# Patient Record
Sex: MALE | Race: WHITE | NOT HISPANIC OR LATINO | URBAN - NONMETROPOLITAN AREA
[De-identification: names, ages, dates, MRNs, and addresses within clinical notes are randomized per-mention and may not be internally consistent; named-entity substitution may affect disease eponyms.]

---

## 2020-07-22 ENCOUNTER — LAB OUTSIDE AN ENCOUNTER (OUTPATIENT)
Dept: URBAN - NONMETROPOLITAN AREA CLINIC 2 | Facility: CLINIC | Age: 75
End: 2020-07-22

## 2020-07-22 ENCOUNTER — OFFICE VISIT (OUTPATIENT)
Dept: URBAN - NONMETROPOLITAN AREA CLINIC 2 | Facility: CLINIC | Age: 75
End: 2020-07-22
Payer: MEDICARE

## 2020-07-22 DIAGNOSIS — K21.9 GERD WITHOUT ESOPHAGITIS: ICD-10-CM

## 2020-07-22 DIAGNOSIS — R13.10 ESOPHAGEAL DYSPHAGIA: ICD-10-CM

## 2020-07-22 PROCEDURE — G8427 DOCREV CUR MEDS BY ELIG CLIN: HCPCS | Performed by: NURSE PRACTITIONER

## 2020-07-22 PROCEDURE — 99204 OFFICE O/P NEW MOD 45 MIN: CPT | Performed by: NURSE PRACTITIONER

## 2020-07-22 PROCEDURE — G8420 CALC BMI NORM PARAMETERS: HCPCS | Performed by: NURSE PRACTITIONER

## 2020-07-22 PROCEDURE — G9903 PT SCRN TBCO ID AS NON USER: HCPCS | Performed by: NURSE PRACTITIONER

## 2020-07-22 RX ORDER — FAMOTIDINE 40 MG/1
(PRIOR AUTH#:000000573666) TABLET, FILM COATED ORAL
Qty: 90 DELAYED RELEASE TABLET | Status: ACTIVE | COMMUNITY

## 2020-07-22 RX ORDER — METHYLPREDNISOLONE 16 MG/1
(PRIOR AUTH#:000000547303) TABLET ORAL
Qty: 30 DELAYED RELEASE TABLET | Status: ACTIVE | COMMUNITY

## 2020-07-22 RX ORDER — SIMVASTATIN 20 MG/1
(PRIOR AUTH#:000009759629) TABLET, FILM COATED ORAL
Qty: 90 DELAYED RELEASE TABLET | Status: ACTIVE | COMMUNITY

## 2020-07-22 RX ORDER — LEVOTHYROXINE SODIUM 100 UG/1
(PRIOR AUTH#:000009759633) TABLET ORAL
Qty: 90 DELAYED RELEASE TABLET | Status: ACTIVE | COMMUNITY

## 2020-07-22 RX ORDER — SITAGLIPTIN AND METFORMIN HYDROCHLORIDE 50; 500 MG/1; MG/1
(PRIOR AUTH#:000009075563) TABLET, FILM COATED ORAL
Qty: 90 DELAYED RELEASE TABLET | Status: ACTIVE | COMMUNITY

## 2020-07-22 RX ORDER — TAMSULOSIN HYDROCHLORIDE 0.4 MG/1
(PRIOR AUTH#:000009075562) CAPSULE ORAL
Qty: 90 CAP | Status: ACTIVE | COMMUNITY

## 2020-07-22 RX ORDER — HYOSCYAMINE SULFATE 0.12 MG/1
1 TABLET UNDER THE TONGUE AND ALLOW TO DISSOLVE EVERY 4-6 HRS  AS NEEDED FOR ABDOMINAL PAIN/SPASM TABLET, ORALLY DISINTEGRATING ORAL THREE TIMES A DAY
Qty: 90 | Refills: 3 | OUTPATIENT
Start: 2020-07-22 | End: 2020-11-18

## 2020-07-22 RX ORDER — METFORMIN HYDROCHLORIDE 500 MG/1
(PRIOR AUTH#:000009075561) TABLET, FILM COATED ORAL
Qty: 90 DELAYED RELEASE TABLET | Status: ACTIVE | COMMUNITY

## 2020-07-22 RX ORDER — ETANERCEPT 50 MG/ML
(PRIOR AUTH#:000006947674) SOLUTION SUBCUTANEOUS
Qty: 12 UNSPECIFIED | Status: ACTIVE | COMMUNITY

## 2020-07-22 RX ORDER — FINASTERIDE 5 MG/1
(PRIOR AUTH#:000009075560) TABLET, FILM COATED ORAL
Qty: 90 DELAYED RELEASE TABLET | Status: ACTIVE | COMMUNITY

## 2020-07-22 RX ORDER — PIOGLITAZONE 15 MG/1
(PRIOR AUTH#:000001068827) TABLET ORAL
Qty: 90 DELAYED RELEASE TABLET | Status: ACTIVE | COMMUNITY

## 2020-07-22 NOTE — HPI-TODAY'S VISIT:
Mr. Javon Cali is a 75 year old male here for dysphagia. He was last seen in 2006 by Dr. Cardoso. He was having dysphagia at that time and had an EGD with an esopahgeal stricture in the lower third of the esophagus, dilated to 50F wiht a negron bougie. He was also found to have gastritis and a small hiatal hernia. Since this time, his dysphagia has progressively getting worse. He is having issues with every meal. He had to regurgitate his food this morning. He denies any reflux and taking pepcid daily. CS

## 2020-07-25 ENCOUNTER — WEB ENCOUNTER (OUTPATIENT)
Dept: URBAN - NONMETROPOLITAN AREA CLINIC 2 | Facility: CLINIC | Age: 75
End: 2020-07-25

## 2020-07-30 ENCOUNTER — OFFICE VISIT (OUTPATIENT)
Dept: URBAN - NONMETROPOLITAN AREA SURGERY CENTER 1 | Facility: SURGERY CENTER | Age: 75
End: 2020-07-30
Payer: MEDICARE

## 2020-07-30 DIAGNOSIS — K22.2 ACQUIRED ESOPHAGEAL RING: ICD-10-CM

## 2020-07-30 PROCEDURE — 43248 EGD GUIDE WIRE INSERTION: CPT | Performed by: INTERNAL MEDICINE

## 2020-07-30 PROCEDURE — G8907 PT DOC NO EVENTS ON DISCHARG: HCPCS | Performed by: INTERNAL MEDICINE

## 2020-08-11 ENCOUNTER — OFFICE VISIT (OUTPATIENT)
Dept: URBAN - METROPOLITAN AREA TELEHEALTH 2 | Facility: TELEHEALTH | Age: 75
End: 2020-08-11
Payer: MEDICARE

## 2020-08-11 DIAGNOSIS — K21.0 GERD WITH ESOPHAGITIS: ICD-10-CM

## 2020-08-11 DIAGNOSIS — R13.19 OTHER DYSPHAGIA: ICD-10-CM

## 2020-08-11 PROCEDURE — G8427 DOCREV CUR MEDS BY ELIG CLIN: HCPCS | Performed by: NURSE PRACTITIONER

## 2020-08-11 PROCEDURE — 3017F COLORECTAL CA SCREEN DOC REV: CPT | Performed by: NURSE PRACTITIONER

## 2020-08-11 PROCEDURE — G8420 CALC BMI NORM PARAMETERS: HCPCS | Performed by: NURSE PRACTITIONER

## 2020-08-11 PROCEDURE — 99213 OFFICE O/P EST LOW 20 MIN: CPT | Performed by: NURSE PRACTITIONER

## 2020-08-11 RX ORDER — SITAGLIPTIN AND METFORMIN HYDROCHLORIDE 50; 500 MG/1; MG/1
(PRIOR AUTH#:000009075563) TABLET, FILM COATED ORAL
Qty: 90 DELAYED RELEASE TABLET | Status: ACTIVE | COMMUNITY

## 2020-08-11 RX ORDER — SIMVASTATIN 20 MG/1
(PRIOR AUTH#:000009759629) TABLET, FILM COATED ORAL
Qty: 90 DELAYED RELEASE TABLET | Status: ACTIVE | COMMUNITY

## 2020-08-11 RX ORDER — METHYLPREDNISOLONE 16 MG/1
(PRIOR AUTH#:000000547303) TABLET ORAL
Qty: 30 DELAYED RELEASE TABLET | Status: ACTIVE | COMMUNITY

## 2020-08-11 RX ORDER — LEVOTHYROXINE SODIUM 100 UG/1
(PRIOR AUTH#:000009759633) TABLET ORAL
Qty: 90 DELAYED RELEASE TABLET | Status: ACTIVE | COMMUNITY

## 2020-08-11 RX ORDER — PIOGLITAZONE 15 MG/1
(PRIOR AUTH#:000001068827) TABLET ORAL
Qty: 90 DELAYED RELEASE TABLET | Status: ACTIVE | COMMUNITY

## 2020-08-11 RX ORDER — FINASTERIDE 5 MG/1
(PRIOR AUTH#:000009075560) TABLET, FILM COATED ORAL
Qty: 90 DELAYED RELEASE TABLET | Status: ACTIVE | COMMUNITY

## 2020-08-11 RX ORDER — SUCRALFATE 1 G/1
1 TABLET ON AN EMPTY STOMACH TABLET ORAL TWICE A DAY
Qty: 60 TABLET | Refills: 3 | OUTPATIENT
Start: 2020-08-11 | End: 2020-12-08

## 2020-08-11 RX ORDER — HYOSCYAMINE SULFATE 0.12 MG/1
1 TABLET UNDER THE TONGUE AND ALLOW TO DISSOLVE EVERY 4-6 HRS  AS NEEDED FOR ABDOMINAL PAIN/SPASM TABLET, ORALLY DISINTEGRATING ORAL THREE TIMES A DAY
Qty: 90 | Refills: 3 | Status: ACTIVE | COMMUNITY
Start: 2020-07-22 | End: 2020-11-18

## 2020-08-11 RX ORDER — FAMOTIDINE 40 MG/1
(PRIOR AUTH#:000000573666) TABLET, FILM COATED ORAL
Qty: 90 DELAYED RELEASE TABLET | Status: ACTIVE | COMMUNITY

## 2020-08-11 RX ORDER — METFORMIN HYDROCHLORIDE 500 MG/1
(PRIOR AUTH#:000009075561) TABLET, FILM COATED ORAL
Qty: 90 DELAYED RELEASE TABLET | Status: ACTIVE | COMMUNITY

## 2020-08-11 RX ORDER — TAMSULOSIN HYDROCHLORIDE 0.4 MG/1
(PRIOR AUTH#:000009075562) CAPSULE ORAL
Qty: 90 CAP | Status: ACTIVE | COMMUNITY

## 2020-08-11 RX ORDER — ETANERCEPT 50 MG/ML
(PRIOR AUTH#:000006947674) SOLUTION SUBCUTANEOUS
Qty: 12 UNSPECIFIED | Status: ACTIVE | COMMUNITY

## 2020-08-11 NOTE — HPI-TODAY'S VISIT:
Mr. Javon Cali is evaluated via telehealth for dysphagia. He had an EGD with an benign appearing esophageal stricture. This was dilated to 36Fr. He also had LA Grade B esophagitis. Since his EGD, he feels 70- 80% better. He continues to have some issues if he does not chew. He denies any painful swallowing. He denies any reflux. Overall, he is feeling better. CS

## 2020-08-11 NOTE — HPI-OTHER HISTORIES
7/22/2020 Mr. Javon Cali is a 75 year old male here for dysphagia. He was last seen in 2006 by Dr. Cardoso. He was having dysphagia at that time and had an EGD with an esopahgeal stricture in the lower third of the esophagus, dilated to 50F wiht a negron bougie. He was also found to have gastritis and a small hiatal hernia. Since this time, his dysphagia has progressively getting worse. He is having issues with every meal. He had to regurgitate his food this morning. He denies any reflux and taking pepcid daily. CS

## 2020-11-13 ENCOUNTER — LAB OUTSIDE AN ENCOUNTER (OUTPATIENT)
Dept: URBAN - METROPOLITAN AREA TELEHEALTH 2 | Facility: TELEHEALTH | Age: 75
End: 2020-11-13

## 2020-11-13 ENCOUNTER — OFFICE VISIT (OUTPATIENT)
Dept: URBAN - METROPOLITAN AREA TELEHEALTH 2 | Facility: TELEHEALTH | Age: 75
End: 2020-11-13
Payer: MEDICARE

## 2020-11-13 ENCOUNTER — TELEPHONE ENCOUNTER (OUTPATIENT)
Dept: URBAN - METROPOLITAN AREA CLINIC 92 | Facility: CLINIC | Age: 75
End: 2020-11-13

## 2020-11-13 DIAGNOSIS — R13.19 CERVICAL DYSPHAGIA: ICD-10-CM

## 2020-11-13 DIAGNOSIS — K21.00 BILE REFLUX ESOPHAGITIS: ICD-10-CM

## 2020-11-13 PROCEDURE — G8427 DOCREV CUR MEDS BY ELIG CLIN: HCPCS | Performed by: INTERNAL MEDICINE

## 2020-11-13 PROCEDURE — 3017F COLORECTAL CA SCREEN DOC REV: CPT | Performed by: INTERNAL MEDICINE

## 2020-11-13 PROCEDURE — 99213 OFFICE O/P EST LOW 20 MIN: CPT | Performed by: INTERNAL MEDICINE

## 2020-11-13 PROCEDURE — G8420 CALC BMI NORM PARAMETERS: HCPCS | Performed by: INTERNAL MEDICINE

## 2020-11-13 RX ORDER — SUCRALFATE 1 G/1
1 TABLET ON AN EMPTY STOMACH TABLET ORAL TWICE A DAY
Qty: 60 TABLET | Refills: 3 | OUTPATIENT

## 2020-11-13 RX ORDER — FAMOTIDINE 40 MG/1
(PRIOR AUTH#:000000573666) TABLET, FILM COATED ORAL
Qty: 90 DELAYED RELEASE TABLET | Status: ACTIVE | COMMUNITY

## 2020-11-13 RX ORDER — SITAGLIPTIN AND METFORMIN HYDROCHLORIDE 50; 500 MG/1; MG/1
(PRIOR AUTH#:000009075563) TABLET, FILM COATED ORAL
Qty: 90 DELAYED RELEASE TABLET | Status: ACTIVE | COMMUNITY

## 2020-11-13 RX ORDER — METFORMIN HYDROCHLORIDE 500 MG/1
(PRIOR AUTH#:000009075561) TABLET, FILM COATED ORAL
Qty: 90 DELAYED RELEASE TABLET | Status: ACTIVE | COMMUNITY

## 2020-11-13 RX ORDER — SIMVASTATIN 20 MG/1
(PRIOR AUTH#:000009759629) TABLET, FILM COATED ORAL
Qty: 90 DELAYED RELEASE TABLET | Status: ACTIVE | COMMUNITY

## 2020-11-13 RX ORDER — LEVOTHYROXINE SODIUM 100 UG/1
(PRIOR AUTH#:000009759633) TABLET ORAL
Qty: 90 DELAYED RELEASE TABLET | Status: ACTIVE | COMMUNITY

## 2020-11-13 RX ORDER — METHYLPREDNISOLONE 16 MG/1
(PRIOR AUTH#:000000547303) TABLET ORAL
Qty: 30 DELAYED RELEASE TABLET | Status: ACTIVE | COMMUNITY

## 2020-11-13 RX ORDER — FINASTERIDE 5 MG/1
(PRIOR AUTH#:000009075560) TABLET, FILM COATED ORAL
Qty: 90 DELAYED RELEASE TABLET | Status: ACTIVE | COMMUNITY

## 2020-11-13 RX ORDER — PIOGLITAZONE 15 MG/1
(PRIOR AUTH#:000001068827) TABLET ORAL
Qty: 90 DELAYED RELEASE TABLET | Status: ACTIVE | COMMUNITY

## 2020-11-13 RX ORDER — ETANERCEPT 50 MG/ML
(PRIOR AUTH#:000006947674) SOLUTION SUBCUTANEOUS
Qty: 12 UNSPECIFIED | Status: ACTIVE | COMMUNITY

## 2020-11-13 RX ORDER — TAMSULOSIN HYDROCHLORIDE 0.4 MG/1
(PRIOR AUTH#:000009075562) CAPSULE ORAL
Qty: 90 CAP | Status: ACTIVE | COMMUNITY

## 2020-11-13 RX ORDER — SUCRALFATE 1 G/1
1 TABLET ON AN EMPTY STOMACH TABLET ORAL TWICE A DAY
Qty: 60 TABLET | Refills: 3 | Status: ACTIVE | COMMUNITY
Start: 2020-08-11 | End: 2020-12-08

## 2020-11-13 RX ORDER — HYOSCYAMINE SULFATE 0.12 MG/1
1 TABLET UNDER THE TONGUE AND ALLOW TO DISSOLVE EVERY 4-6 HRS  AS NEEDED FOR ABDOMINAL PAIN/SPASM TABLET, ORALLY DISINTEGRATING ORAL THREE TIMES A DAY
Qty: 90 | Refills: 3 | Status: ACTIVE | COMMUNITY
Start: 2020-07-22 | End: 2020-11-18

## 2020-11-13 NOTE — HPI-OTHER HISTORIES
7/22/2020 Mr. Javon Cali is a 75 year old male here for dysphagia. He was last seen in 2006 by Dr. Cardoso. He was having dysphagia at that time and had an EGD with an esopahgeal stricture in the lower third of the esophagus, dilated to 50F wiht a negron bougie. He was also found to have gastritis and a small hiatal hernia. Since this time, his dysphagia has progressively getting worse. He is having issues with every meal. He had to regurgitate his food this morning. He denies any reflux and taking pepcid daily. CS   8/11/2020 Mr. Javon Cali is evaluated via telehealth for dysphagia. He had an EGD with an benign appearing esophageal stricture. This was dilated to 36Fr. He also had LA Grade B esophagitis. Since his EGD, he feels 70- 80% better. He continues to have some issues if he does not chew. He denies any painful swallowing. He denies any reflux. Overall, he is feeling better. CS

## 2020-11-13 NOTE — HPI-TODAY'S VISIT:
8/11/2020 Mr. Javon Cali is evaluated via telehealth for dysphagia. He had an EGD in July with an benign appearing esophageal stricture. This was dilated to 36Fr. He also had LA Grade B esophagitis. At his last OV, he was feeling better. We started carafate in the hope that his left over symptoms were related to esophagitis. This has not helped and now his symptoms have returned and he is back having to regurgitate his food. CS

## 2020-12-03 ENCOUNTER — OFFICE VISIT (OUTPATIENT)
Dept: URBAN - NONMETROPOLITAN AREA SURGERY CENTER 1 | Facility: SURGERY CENTER | Age: 75
End: 2020-12-03

## 2020-12-17 ENCOUNTER — OFFICE VISIT (OUTPATIENT)
Dept: URBAN - NONMETROPOLITAN AREA SURGERY CENTER 1 | Facility: SURGERY CENTER | Age: 75
End: 2020-12-17
Payer: MEDICARE

## 2020-12-17 ENCOUNTER — TELEPHONE ENCOUNTER (OUTPATIENT)
Dept: URBAN - METROPOLITAN AREA CLINIC 92 | Facility: CLINIC | Age: 75
End: 2020-12-17

## 2020-12-17 ENCOUNTER — CLAIMS CREATED FROM THE CLAIM WINDOW (OUTPATIENT)
Dept: URBAN - METROPOLITAN AREA CLINIC 4 | Facility: CLINIC | Age: 75
End: 2020-12-17
Payer: MEDICARE

## 2020-12-17 DIAGNOSIS — K21.00 GASTRO-ESOPHAGEAL REFLUX DISEASE WITH ESOPHAGITIS, WITHOUT BLEEDING: ICD-10-CM

## 2020-12-17 DIAGNOSIS — K21.9 ACID REFLUX: ICD-10-CM

## 2020-12-17 DIAGNOSIS — K22.2 ACQUIRED ESOPHAGEAL RING: ICD-10-CM

## 2020-12-17 DIAGNOSIS — K25.7 CHRONIC GASTRIC ULCER WITHOUT HEMORRHAGE OR PERFORATION: ICD-10-CM

## 2020-12-17 DIAGNOSIS — K25.7: ICD-10-CM

## 2020-12-17 PROCEDURE — G8907 PT DOC NO EVENTS ON DISCHARG: HCPCS | Performed by: INTERNAL MEDICINE

## 2020-12-17 PROCEDURE — 43239 EGD BIOPSY SINGLE/MULTIPLE: CPT | Performed by: INTERNAL MEDICINE

## 2020-12-17 PROCEDURE — 88305 TISSUE EXAM BY PATHOLOGIST: CPT | Performed by: PATHOLOGY

## 2020-12-17 PROCEDURE — 43248 EGD GUIDE WIRE INSERTION: CPT | Performed by: INTERNAL MEDICINE

## 2020-12-17 PROCEDURE — 88341 IMHCHEM/IMCYTCHM EA ADD ANTB: CPT | Performed by: PATHOLOGY

## 2020-12-17 PROCEDURE — 88312 SPECIAL STAINS GROUP 1: CPT | Performed by: PATHOLOGY

## 2020-12-17 PROCEDURE — 88342 IMHCHEM/IMCYTCHM 1ST ANTB: CPT | Performed by: PATHOLOGY

## 2020-12-17 RX ORDER — TAMSULOSIN HYDROCHLORIDE 0.4 MG/1
(PRIOR AUTH#:000009075562) CAPSULE ORAL
Qty: 90 CAP | Status: ACTIVE | COMMUNITY

## 2020-12-17 RX ORDER — FAMOTIDINE 40 MG/1
(PRIOR AUTH#:000000573666) TABLET, FILM COATED ORAL
Qty: 90 DELAYED RELEASE TABLET | Status: ACTIVE | COMMUNITY

## 2020-12-17 RX ORDER — METFORMIN HYDROCHLORIDE 500 MG/1
(PRIOR AUTH#:000009075561) TABLET, FILM COATED ORAL
Qty: 90 DELAYED RELEASE TABLET | Status: ACTIVE | COMMUNITY

## 2020-12-17 RX ORDER — PANTOPRAZOLE SODIUM 40 MG/1
1 TABLET TABLET, DELAYED RELEASE ORAL BID
Qty: 60 | Refills: 6 | OUTPATIENT
Start: 2020-12-17

## 2020-12-17 RX ORDER — SITAGLIPTIN AND METFORMIN HYDROCHLORIDE 50; 500 MG/1; MG/1
(PRIOR AUTH#:000009075563) TABLET, FILM COATED ORAL
Qty: 90 DELAYED RELEASE TABLET | Status: ACTIVE | COMMUNITY

## 2020-12-17 RX ORDER — PIOGLITAZONE 15 MG/1
(PRIOR AUTH#:000001068827) TABLET ORAL
Qty: 90 DELAYED RELEASE TABLET | Status: ACTIVE | COMMUNITY

## 2020-12-17 RX ORDER — ETANERCEPT 50 MG/ML
(PRIOR AUTH#:000006947674) SOLUTION SUBCUTANEOUS
Qty: 12 UNSPECIFIED | Status: ACTIVE | COMMUNITY

## 2020-12-17 RX ORDER — SIMVASTATIN 20 MG/1
(PRIOR AUTH#:000009759629) TABLET, FILM COATED ORAL
Qty: 90 DELAYED RELEASE TABLET | Status: ACTIVE | COMMUNITY

## 2020-12-17 RX ORDER — SUCRALFATE 1 G/1
1 TABLET ON AN EMPTY STOMACH TABLET ORAL TWICE A DAY
Qty: 60 TABLET | Refills: 3 | Status: ACTIVE | COMMUNITY

## 2020-12-17 RX ORDER — METHYLPREDNISOLONE 16 MG/1
(PRIOR AUTH#:000000547303) TABLET ORAL
Qty: 30 DELAYED RELEASE TABLET | Status: ACTIVE | COMMUNITY

## 2020-12-17 RX ORDER — LEVOTHYROXINE SODIUM 100 UG/1
(PRIOR AUTH#:000009759633) TABLET ORAL
Qty: 90 DELAYED RELEASE TABLET | Status: ACTIVE | COMMUNITY

## 2020-12-17 RX ORDER — FINASTERIDE 5 MG/1
(PRIOR AUTH#:000009075560) TABLET, FILM COATED ORAL
Qty: 90 DELAYED RELEASE TABLET | Status: ACTIVE | COMMUNITY

## 2021-01-15 ENCOUNTER — OFFICE VISIT (OUTPATIENT)
Dept: URBAN - METROPOLITAN AREA TELEHEALTH 2 | Facility: TELEHEALTH | Age: 76
End: 2021-01-15
Payer: MEDICARE

## 2021-01-15 DIAGNOSIS — R13.10 ESOPHAGEAL DYSPHAGIA: ICD-10-CM

## 2021-01-15 DIAGNOSIS — K21.00 CHRONIC REFLUX ESOPHAGITIS: ICD-10-CM

## 2021-01-15 PROBLEM — 266433003: Status: ACTIVE | Noted: 2020-08-11

## 2021-01-15 PROBLEM — 40890009: Status: ACTIVE | Noted: 2020-08-11

## 2021-01-15 PROCEDURE — 99213 OFFICE O/P EST LOW 20 MIN: CPT | Performed by: NURSE PRACTITIONER

## 2021-01-15 PROCEDURE — 3017F COLORECTAL CA SCREEN DOC REV: CPT | Performed by: NURSE PRACTITIONER

## 2021-01-15 PROCEDURE — G8427 DOCREV CUR MEDS BY ELIG CLIN: HCPCS | Performed by: NURSE PRACTITIONER

## 2021-01-15 PROCEDURE — G8420 CALC BMI NORM PARAMETERS: HCPCS | Performed by: NURSE PRACTITIONER

## 2021-01-15 RX ORDER — LEVOTHYROXINE SODIUM 100 UG/1
(PRIOR AUTH#:000009759633) TABLET ORAL
Qty: 90 DELAYED RELEASE TABLET | Status: ACTIVE | COMMUNITY

## 2021-01-15 RX ORDER — FINASTERIDE 5 MG/1
(PRIOR AUTH#:000009075560) TABLET, FILM COATED ORAL
Qty: 90 DELAYED RELEASE TABLET | Status: ACTIVE | COMMUNITY

## 2021-01-15 RX ORDER — METFORMIN HYDROCHLORIDE 500 MG/1
(PRIOR AUTH#:000009075561) TABLET, FILM COATED ORAL
Qty: 90 DELAYED RELEASE TABLET | Status: ACTIVE | COMMUNITY

## 2021-01-15 RX ORDER — PIOGLITAZONE 15 MG/1
(PRIOR AUTH#:000001068827) TABLET ORAL
Qty: 90 DELAYED RELEASE TABLET | Status: ACTIVE | COMMUNITY

## 2021-01-15 RX ORDER — SUCRALFATE 1 G/1
1 TABLET ON AN EMPTY STOMACH TABLET ORAL TWICE A DAY
Qty: 60 TABLET | Refills: 3 | OUTPATIENT

## 2021-01-15 RX ORDER — SUCRALFATE 1 G/1
1 TABLET ON AN EMPTY STOMACH TABLET ORAL TWICE A DAY
Qty: 60 TABLET | Refills: 3 | Status: ACTIVE | COMMUNITY

## 2021-01-15 RX ORDER — FAMOTIDINE 40 MG/1
(PRIOR AUTH#:000000573666) TABLET, FILM COATED ORAL
Qty: 90 DELAYED RELEASE TABLET | Status: ACTIVE | COMMUNITY

## 2021-01-15 RX ORDER — TAMSULOSIN HYDROCHLORIDE 0.4 MG/1
(PRIOR AUTH#:000009075562) CAPSULE ORAL
Qty: 90 CAP | Status: ACTIVE | COMMUNITY

## 2021-01-15 RX ORDER — SIMVASTATIN 20 MG/1
(PRIOR AUTH#:000009759629) TABLET, FILM COATED ORAL
Qty: 90 DELAYED RELEASE TABLET | Status: ACTIVE | COMMUNITY

## 2021-01-15 RX ORDER — ETANERCEPT 50 MG/ML
(PRIOR AUTH#:000006947674) SOLUTION SUBCUTANEOUS
Qty: 12 UNSPECIFIED | Status: ACTIVE | COMMUNITY

## 2021-01-15 RX ORDER — METHYLPREDNISOLONE 16 MG/1
(PRIOR AUTH#:000000547303) TABLET ORAL
Qty: 30 DELAYED RELEASE TABLET | Status: ACTIVE | COMMUNITY

## 2021-01-15 RX ORDER — PANTOPRAZOLE SODIUM 40 MG/1
1 TABLET TABLET, DELAYED RELEASE ORAL BID
Qty: 60 | Refills: 6 | Status: ACTIVE | COMMUNITY
Start: 2020-12-17

## 2021-01-15 RX ORDER — SITAGLIPTIN AND METFORMIN HYDROCHLORIDE 50; 500 MG/1; MG/1
(PRIOR AUTH#:000009075563) TABLET, FILM COATED ORAL
Qty: 90 DELAYED RELEASE TABLET | Status: ACTIVE | COMMUNITY

## 2021-01-15 NOTE — HPI-OTHER HISTORIES
7/22/2020 Mr. Javon Cali is a 75 year old male here for dysphagia. He was last seen in 2006 by Dr. Cardoso. He was having dysphagia at that time and had an EGD with an esopahgeal stricture in the lower third of the esophagus, dilated to 50F wiht a negron bougie. He was also found to have gastritis and a small hiatal hernia. Since this time, his dysphagia has progressively getting worse. He is having issues with every meal. He had to regurgitate his food this morning. He denies any reflux and taking pepcid daily. CS   8/11/2020 Mr. Javon Cali is evaluated via telehealth for dysphagia. He had an EGD with an benign appearing esophageal stricture. This was dilated to 36Fr. He also had LA Grade B esophagitis. Since his EGD, he feels 70- 80% better. He continues to have some issues if he does not chew. He denies any painful swallowing. He denies any reflux. Overall, he is feeling better. CS   10/13/2020 Mr. Javon Cali is evaluated via telehealth for dysphagia. He had an EGD in July with an benign appearing esophageal stricture. This was dilated to 36Fr. He also had LA Grade B esophagitis. At his last OV, he was feeling better. We started carafate in the hope that his left over symptoms were related to esophagitis. This has not helped and now his symptoms have returned and he is back having to regurgitate his food. CS

## 2021-01-15 NOTE — HPI-TODAY'S VISIT:
1/15/2021 Mr. Javon Cali is evaluated via telehealth for dysphagia. He had an EGD in July with an benign appearing esophageal stricture. This was dilated to 36Fr. He also had LA Grade B esophagitis. He felt better for a period of time on pepcid and carafate. His symptoms then returned. He had a repeat EGD 12/18 with esophagitis, gastritis, and stricture was dlated to 48Fr. He was also started on protonix 40mg BID. Today, he feels well. He has had no issues with his swallowing. CS

## 2021-02-09 ENCOUNTER — TELEPHONE ENCOUNTER (OUTPATIENT)
Dept: URBAN - NONMETROPOLITAN AREA CLINIC 2 | Facility: CLINIC | Age: 76
End: 2021-02-09

## 2021-02-09 RX ORDER — PANTOPRAZOLE SODIUM 40 MG/1
1 TABLET TABLET, DELAYED RELEASE ORAL BID
Qty: 180 TABLET | Refills: 3
Start: 2020-12-17

## 2022-03-16 ENCOUNTER — ERX REFILL RESPONSE (OUTPATIENT)
Dept: URBAN - NONMETROPOLITAN AREA CLINIC 2 | Facility: CLINIC | Age: 77
End: 2022-03-16

## 2022-03-16 RX ORDER — PANTOPRAZOLE 40 MG/1
TAKE 1 TABLET TWICE A DAY TABLET, DELAYED RELEASE ORAL
Qty: 180 TABLET | Refills: 4 | OUTPATIENT

## 2022-03-16 RX ORDER — PANTOPRAZOLE SODIUM 40 MG/1
1 TABLET TABLET, DELAYED RELEASE ORAL BID
Qty: 180 TABLET | Refills: 3 | OUTPATIENT

## 2022-03-23 ENCOUNTER — P2P PATIENT RECORD (OUTPATIENT)
Age: 77
End: 2022-03-23

## 2022-05-11 ENCOUNTER — WEB ENCOUNTER (OUTPATIENT)
Dept: URBAN - NONMETROPOLITAN AREA CLINIC 2 | Facility: CLINIC | Age: 77
End: 2022-05-11

## 2022-05-11 ENCOUNTER — OFFICE VISIT (OUTPATIENT)
Dept: URBAN - NONMETROPOLITAN AREA CLINIC 2 | Facility: CLINIC | Age: 77
End: 2022-05-11
Payer: MEDICARE

## 2022-05-11 DIAGNOSIS — R13.19 ESOPHAGEAL DYSPHAGIA: ICD-10-CM

## 2022-05-11 PROCEDURE — 99214 OFFICE O/P EST MOD 30 MIN: CPT | Performed by: INTERNAL MEDICINE

## 2022-05-11 RX ORDER — SITAGLIPTIN AND METFORMIN HYDROCHLORIDE 50; 500 MG/1; MG/1
(PRIOR AUTH#:000009075563) TABLET, FILM COATED ORAL
Qty: 90 DELAYED RELEASE TABLET | Status: ACTIVE | COMMUNITY

## 2022-05-11 RX ORDER — FAMOTIDINE 40 MG/1
(PRIOR AUTH#:000000573666) TABLET, FILM COATED ORAL
Qty: 90 DELAYED RELEASE TABLET | Status: ACTIVE | COMMUNITY

## 2022-05-11 RX ORDER — LEVOTHYROXINE SODIUM 100 UG/1
(PRIOR AUTH#:000009759633) TABLET ORAL
Qty: 90 DELAYED RELEASE TABLET | Status: ACTIVE | COMMUNITY

## 2022-05-11 RX ORDER — PIOGLITAZONE 15 MG/1
(PRIOR AUTH#:000001068827) TABLET ORAL
Qty: 90 DELAYED RELEASE TABLET | Status: ACTIVE | COMMUNITY

## 2022-05-11 RX ORDER — METHYLPREDNISOLONE 16 MG/1
(PRIOR AUTH#:000000547303) TABLET ORAL
Qty: 30 DELAYED RELEASE TABLET | Status: ACTIVE | COMMUNITY

## 2022-05-11 RX ORDER — PANTOPRAZOLE 40 MG/1
TAKE 1 TABLET TWICE A DAY TABLET, DELAYED RELEASE ORAL
Qty: 180 TABLET | Refills: 4 | Status: ACTIVE | COMMUNITY

## 2022-05-11 RX ORDER — FINASTERIDE 5 MG/1
(PRIOR AUTH#:000009075560) TABLET, FILM COATED ORAL
Qty: 90 DELAYED RELEASE TABLET | Status: ACTIVE | COMMUNITY

## 2022-05-11 RX ORDER — TAMSULOSIN HYDROCHLORIDE 0.4 MG/1
(PRIOR AUTH#:000009075562) CAPSULE ORAL
Qty: 90 CAP | Status: ACTIVE | COMMUNITY

## 2022-05-11 RX ORDER — SUCRALFATE 1 G/1
1 TABLET ON AN EMPTY STOMACH TABLET ORAL TWICE A DAY
Qty: 60 TABLET | Refills: 3 | Status: ACTIVE | COMMUNITY

## 2022-05-11 RX ORDER — METFORMIN HYDROCHLORIDE 500 MG/1
(PRIOR AUTH#:000009075561) TABLET, FILM COATED ORAL
Qty: 90 DELAYED RELEASE TABLET | Status: ACTIVE | COMMUNITY

## 2022-05-11 RX ORDER — SIMVASTATIN 20 MG/1
(PRIOR AUTH#:000009759629) TABLET, FILM COATED ORAL
Qty: 90 DELAYED RELEASE TABLET | Status: ACTIVE | COMMUNITY

## 2022-05-11 RX ORDER — ETANERCEPT 50 MG/ML
(PRIOR AUTH#:000006947674) SOLUTION SUBCUTANEOUS
Qty: 12 UNSPECIFIED | Status: ACTIVE | COMMUNITY

## 2022-05-11 NOTE — HPI-TODAY'S VISIT:
5/11/2022 Mr. Cali returns for follow-up.  He has a history of esophageal stenosis requiring dilation.  The last time he was dilated it was in 2020.  He was dilated with a 48 Cymraes savory dilator.  He has done well for the last 2 years but has noticed some increasing solid food dysphagia.  This happens 7-8 times per month.  He has not had any weight loss.

## 2022-05-31 ENCOUNTER — CLAIMS CREATED FROM THE CLAIM WINDOW (OUTPATIENT)
Dept: URBAN - METROPOLITAN AREA CLINIC 4 | Facility: CLINIC | Age: 77
End: 2022-05-31
Payer: MEDICARE

## 2022-05-31 ENCOUNTER — OFFICE VISIT (OUTPATIENT)
Dept: URBAN - NONMETROPOLITAN AREA SURGERY CENTER 1 | Facility: SURGERY CENTER | Age: 77
End: 2022-05-31
Payer: MEDICARE

## 2022-05-31 DIAGNOSIS — K22.2 ACQUIRED ESOPHAGEAL RING: ICD-10-CM

## 2022-05-31 DIAGNOSIS — K31.89 ACQUIRED DEFORMITY OF DUODENUM: ICD-10-CM

## 2022-05-31 DIAGNOSIS — K29.70 GASTRITIS, UNSPECIFIED, WITHOUT BLEEDING: ICD-10-CM

## 2022-05-31 PROCEDURE — 88305 TISSUE EXAM BY PATHOLOGIST: CPT | Performed by: PATHOLOGY

## 2022-05-31 PROCEDURE — 88312 SPECIAL STAINS GROUP 1: CPT | Performed by: PATHOLOGY

## 2022-05-31 PROCEDURE — 43239 EGD BIOPSY SINGLE/MULTIPLE: CPT | Performed by: INTERNAL MEDICINE

## 2022-05-31 PROCEDURE — G8907 PT DOC NO EVENTS ON DISCHARG: HCPCS | Performed by: INTERNAL MEDICINE

## 2022-05-31 PROCEDURE — 43248 EGD GUIDE WIRE INSERTION: CPT | Performed by: INTERNAL MEDICINE

## 2022-06-02 ENCOUNTER — TELEPHONE ENCOUNTER (OUTPATIENT)
Dept: URBAN - NONMETROPOLITAN AREA CLINIC 2 | Facility: CLINIC | Age: 77
End: 2022-06-02

## 2022-08-26 ENCOUNTER — TELEPHONE ENCOUNTER (OUTPATIENT)
Dept: URBAN - NONMETROPOLITAN AREA CLINIC 2 | Facility: CLINIC | Age: 77
End: 2022-08-26

## 2022-09-01 ENCOUNTER — CLAIMS CREATED FROM THE CLAIM WINDOW (OUTPATIENT)
Dept: URBAN - NONMETROPOLITAN AREA CLINIC 13 | Facility: CLINIC | Age: 77
End: 2022-09-01
Payer: MEDICARE

## 2022-09-01 ENCOUNTER — LAB OUTSIDE AN ENCOUNTER (OUTPATIENT)
Dept: URBAN - NONMETROPOLITAN AREA CLINIC 13 | Facility: CLINIC | Age: 77
End: 2022-09-01

## 2022-09-01 ENCOUNTER — WEB ENCOUNTER (OUTPATIENT)
Dept: URBAN - NONMETROPOLITAN AREA CLINIC 13 | Facility: CLINIC | Age: 77
End: 2022-09-01

## 2022-09-01 VITALS
DIASTOLIC BLOOD PRESSURE: 79 MMHG | WEIGHT: 164 LBS | HEIGHT: 67 IN | HEART RATE: 61 BPM | SYSTOLIC BLOOD PRESSURE: 135 MMHG | BODY MASS INDEX: 25.74 KG/M2

## 2022-09-01 DIAGNOSIS — R13.19 ESOPHAGEAL DYSPHAGIA: ICD-10-CM

## 2022-09-01 PROCEDURE — 99214 OFFICE O/P EST MOD 30 MIN: CPT | Performed by: NURSE PRACTITIONER

## 2022-09-01 RX ORDER — SIMVASTATIN 20 MG/1
(PRIOR AUTH#:000009759629) TABLET, FILM COATED ORAL
Qty: 90 DELAYED RELEASE TABLET | Status: ACTIVE | COMMUNITY

## 2022-09-01 RX ORDER — SITAGLIPTIN AND METFORMIN HYDROCHLORIDE 50; 500 MG/1; MG/1
(PRIOR AUTH#:000009075563) TABLET, FILM COATED ORAL
Qty: 90 DELAYED RELEASE TABLET | Status: ACTIVE | COMMUNITY

## 2022-09-01 RX ORDER — LEVOTHYROXINE SODIUM 100 UG/1
(PRIOR AUTH#:000009759633) TABLET ORAL
Qty: 90 DELAYED RELEASE TABLET | Status: ACTIVE | COMMUNITY

## 2022-09-01 RX ORDER — ETANERCEPT 50 MG/ML
(PRIOR AUTH#:000006947674) SOLUTION SUBCUTANEOUS
Qty: 12 UNSPECIFIED | Status: ACTIVE | COMMUNITY

## 2022-09-01 RX ORDER — PANTOPRAZOLE 40 MG/1
TAKE 1 TABLET TWICE A DAY TABLET, DELAYED RELEASE ORAL
Qty: 180 TABLET | Refills: 4 | Status: ACTIVE | COMMUNITY

## 2022-09-01 RX ORDER — PIOGLITAZONE 15 MG/1
(PRIOR AUTH#:000001068827) TABLET ORAL
Qty: 90 DELAYED RELEASE TABLET | Status: ACTIVE | COMMUNITY

## 2022-09-01 RX ORDER — METHYLPREDNISOLONE 16 MG/1
(PRIOR AUTH#:000000547303) TABLET ORAL
Qty: 30 DELAYED RELEASE TABLET | Status: ACTIVE | COMMUNITY

## 2022-09-01 RX ORDER — TAMSULOSIN HYDROCHLORIDE 0.4 MG/1
(PRIOR AUTH#:000009075562) CAPSULE ORAL
Qty: 90 CAP | Status: ACTIVE | COMMUNITY

## 2022-09-01 RX ORDER — FAMOTIDINE 40 MG/1
(PRIOR AUTH#:000000573666) TABLET, FILM COATED ORAL
Qty: 90 DELAYED RELEASE TABLET | Status: ACTIVE | COMMUNITY

## 2022-09-01 RX ORDER — FINASTERIDE 5 MG/1
(PRIOR AUTH#:000009075560) TABLET, FILM COATED ORAL
Qty: 90 DELAYED RELEASE TABLET | Status: ACTIVE | COMMUNITY

## 2022-09-01 RX ORDER — METFORMIN HYDROCHLORIDE 500 MG/1
(PRIOR AUTH#:000009075561) TABLET, FILM COATED ORAL
Qty: 90 DELAYED RELEASE TABLET | Status: ACTIVE | COMMUNITY

## 2022-09-01 RX ORDER — SUCRALFATE 1 G/1
1 TABLET ON AN EMPTY STOMACH TABLET ORAL TWICE A DAY
Qty: 60 TABLET | Refills: 3 | Status: ACTIVE | COMMUNITY

## 2022-09-01 NOTE — HPI-TODAY'S VISIT:
9/1/2022  Mr. Cali returns for dysphagia.  He has a history of esophageal stenosis requiring dilation.  The last time he was dilated it was 5/31.  He was dilated with a 42 Belizean savory dilator.  This helped until last week. He ate a salad and it got stuck. He was unable to handle his secretions for about 6 hrs.  He is ready to schedule another dilatation. CS

## 2022-09-01 NOTE — HPI-OTHER HISTORIES
5/11/2022 Mr. Cali returns for follow-up.  He has a history of esophageal stenosis requiring dilation.  The last time he was dilated it was in 2020.  He was dilated with a 48 Telugu savory dilator.  He has done well for the last 2 years but has noticed some increasing solid food dysphagia.  This happens 7-8 times per month.  He has not had any weight loss.  5/31/2022 EGD: Moderate stenosis mesauring 8mm, dilated with Savory 42F

## 2022-09-02 ENCOUNTER — OFFICE VISIT (OUTPATIENT)
Dept: URBAN - METROPOLITAN AREA MEDICAL CENTER 1 | Facility: MEDICAL CENTER | Age: 77
End: 2022-09-02
Payer: MEDICARE

## 2022-09-02 DIAGNOSIS — K22.89 DILATATION OF ESOPHAGUS: ICD-10-CM

## 2022-09-02 DIAGNOSIS — K22.2 ACQUIRED ESOPHAGEAL RING: ICD-10-CM

## 2022-09-02 PROCEDURE — 43248 EGD GUIDE WIRE INSERTION: CPT | Performed by: INTERNAL MEDICINE

## 2022-12-20 ENCOUNTER — OFFICE VISIT (OUTPATIENT)
Dept: URBAN - NONMETROPOLITAN AREA CLINIC 13 | Facility: CLINIC | Age: 77
End: 2022-12-20
Payer: MEDICARE

## 2022-12-20 VITALS
WEIGHT: 163 LBS | TEMPERATURE: 98.5 F | DIASTOLIC BLOOD PRESSURE: 67 MMHG | HEIGHT: 67 IN | SYSTOLIC BLOOD PRESSURE: 146 MMHG | HEART RATE: 76 BPM | BODY MASS INDEX: 25.58 KG/M2

## 2022-12-20 DIAGNOSIS — R13.19 ESOPHAGEAL DYSPHAGIA: ICD-10-CM

## 2022-12-20 PROCEDURE — 99213 OFFICE O/P EST LOW 20 MIN: CPT | Performed by: NURSE PRACTITIONER

## 2022-12-20 RX ORDER — LEVOTHYROXINE SODIUM 100 UG/1
(PRIOR AUTH#:000009759633) TABLET ORAL
Qty: 90 DELAYED RELEASE TABLET | Status: ACTIVE | COMMUNITY

## 2022-12-20 RX ORDER — FINASTERIDE 5 MG/1
(PRIOR AUTH#:000009075560) TABLET, FILM COATED ORAL
Qty: 90 DELAYED RELEASE TABLET | Status: ACTIVE | COMMUNITY

## 2022-12-20 RX ORDER — PIOGLITAZONE 15 MG/1
(PRIOR AUTH#:000001068827) TABLET ORAL
Qty: 90 DELAYED RELEASE TABLET | Status: ACTIVE | COMMUNITY

## 2022-12-20 RX ORDER — SIMVASTATIN 20 MG/1
(PRIOR AUTH#:000009759629) TABLET, FILM COATED ORAL
Qty: 90 DELAYED RELEASE TABLET | Status: ACTIVE | COMMUNITY

## 2022-12-20 RX ORDER — ETANERCEPT 50 MG/ML
(PRIOR AUTH#:000006947674) SOLUTION SUBCUTANEOUS
Qty: 12 UNSPECIFIED | Status: ACTIVE | COMMUNITY

## 2022-12-20 RX ORDER — FAMOTIDINE 40 MG/1
(PRIOR AUTH#:000000573666) TABLET, FILM COATED ORAL
Qty: 90 DELAYED RELEASE TABLET | Status: ACTIVE | COMMUNITY

## 2022-12-20 RX ORDER — SUCRALFATE 1 G/1
1 TABLET ON AN EMPTY STOMACH TABLET ORAL TWICE A DAY
Qty: 60 TABLET | Refills: 3 | Status: ACTIVE | COMMUNITY

## 2022-12-20 RX ORDER — PANTOPRAZOLE SODIUM 40 MG/1
1 TABLET TABLET, DELAYED RELEASE ORAL TWICE A DAY
Qty: 180 TABLET | Refills: 3

## 2022-12-20 RX ORDER — PANTOPRAZOLE 40 MG/1
TAKE 1 TABLET TWICE A DAY TABLET, DELAYED RELEASE ORAL
Qty: 180 TABLET | Refills: 4 | Status: ACTIVE | COMMUNITY

## 2022-12-20 RX ORDER — SITAGLIPTIN AND METFORMIN HYDROCHLORIDE 50; 500 MG/1; MG/1
(PRIOR AUTH#:000009075563) TABLET, FILM COATED ORAL
Qty: 90 DELAYED RELEASE TABLET | Status: ACTIVE | COMMUNITY

## 2022-12-20 RX ORDER — HYOSCYAMINE SULFATE 0.12 MG/1
1 TABLET UNDER THE TONGUE AND ALLOW TO DISSOLVE EVERY 4-6 HRS  AS NEEDED FOR ABDOMINAL PAIN/SPASM TABLET, ORALLY DISINTEGRATING ORAL THREE TIMES A DAY
Qty: 90 | Refills: 3
Start: 2020-07-22 | End: 2023-04-19

## 2022-12-20 RX ORDER — TAMSULOSIN HYDROCHLORIDE 0.4 MG/1
(PRIOR AUTH#:000009075562) CAPSULE ORAL
Qty: 90 CAP | Status: ACTIVE | COMMUNITY

## 2022-12-20 RX ORDER — METFORMIN HYDROCHLORIDE 500 MG/1
(PRIOR AUTH#:000009075561) TABLET, FILM COATED ORAL
Qty: 90 DELAYED RELEASE TABLET | Status: ACTIVE | COMMUNITY

## 2022-12-20 RX ORDER — METHYLPREDNISOLONE 16 MG/1
(PRIOR AUTH#:000000547303) TABLET ORAL
Qty: 30 DELAYED RELEASE TABLET | Status: ACTIVE | COMMUNITY

## 2022-12-20 NOTE — HPI-OTHER HISTORIES
5/11/2022 Mr. Cali returns for follow-up.  He has a history of esophageal stenosis requiring dilation.  The last time he was dilated it was in 2020.  He was dilated with a 48 Solomon Islander savory dilator.  He has done well for the last 2 years but has noticed some increasing solid food dysphagia.  This happens 7-8 times per month.  He has not had any weight loss.  5/31/2022 EGD: Moderate stenosis mesauring 8mm, dilated with Savory 42F  9/1/2022  Mr. Cali returns for dysphagia.  He has a history of esophageal stenosis requiring dilation.  The last time he was dilated it was 5/31.  He was dilated with a 42 Solomon Islander savory dilator.  This helped until last week. He ate a salad and it got stuck. He was unable to handle his secretions for about 6 hrs.  He is ready to schedule another dilatation. CS  9/2/2022 EGD: Moderate schatzki's ring, dilated to 51F

## 2022-12-20 NOTE — HPI-TODAY'S VISIT:
12/20/2022  Mr. Cali returns for dysphagia.  He has a history of esophageal stenosis requiring dilation.  The last time he was dilated it was 9/2022.  He was dilated with a 51 Danish savory dilator.  He has done great. He has been able to swallow with no issues. He will have some minor occurances but these are not too bothersome. Levsin has helped too. Overall, he is feeling really well and has no GI complaints. CS

## 2022-12-27 ENCOUNTER — P2P PATIENT RECORD (OUTPATIENT)
Age: 77
End: 2022-12-27

## 2023-03-10 ENCOUNTER — TELEPHONE ENCOUNTER (OUTPATIENT)
Dept: URBAN - NONMETROPOLITAN AREA CLINIC 2 | Facility: CLINIC | Age: 78
End: 2023-03-10

## 2023-03-10 RX ORDER — HYOSCYAMINE SULFATE 0.12 MG/1
1 TABLET UNDER THE TONGUE AND ALLOW TO DISSOLVE EVERY 4-6 HRS  AS NEEDED FOR ABDOMINAL PAIN/SPASM TABLET, ORALLY DISINTEGRATING ORAL THREE TIMES A DAY
Qty: 90 | Refills: 3
Start: 2020-07-22 | End: 2023-07-08

## 2023-04-10 ENCOUNTER — TELEPHONE ENCOUNTER (OUTPATIENT)
Dept: URBAN - NONMETROPOLITAN AREA CLINIC 13 | Facility: CLINIC | Age: 78
End: 2023-04-10

## 2023-06-20 ENCOUNTER — OFFICE VISIT (OUTPATIENT)
Dept: URBAN - NONMETROPOLITAN AREA CLINIC 13 | Facility: CLINIC | Age: 78
End: 2023-06-20
Payer: MEDICARE

## 2023-06-20 ENCOUNTER — LAB OUTSIDE AN ENCOUNTER (OUTPATIENT)
Dept: URBAN - NONMETROPOLITAN AREA CLINIC 13 | Facility: CLINIC | Age: 78
End: 2023-06-20

## 2023-06-20 VITALS
WEIGHT: 173 LBS | DIASTOLIC BLOOD PRESSURE: 55 MMHG | BODY MASS INDEX: 27.15 KG/M2 | HEART RATE: 92 BPM | SYSTOLIC BLOOD PRESSURE: 113 MMHG | HEIGHT: 67 IN

## 2023-06-20 DIAGNOSIS — R13.19 ESOPHAGEAL DYSPHAGIA: ICD-10-CM

## 2023-06-20 PROCEDURE — 99214 OFFICE O/P EST MOD 30 MIN: CPT | Performed by: NURSE PRACTITIONER

## 2023-06-20 RX ORDER — LEVOTHYROXINE SODIUM 100 UG/1
(PRIOR AUTH#:000009759633) TABLET ORAL
Qty: 90 DELAYED RELEASE TABLET | Status: ACTIVE | COMMUNITY

## 2023-06-20 RX ORDER — HYOSCYAMINE SULFATE 0.12 MG/1
1 TABLET UNDER THE TONGUE AND ALLOW TO DISSOLVE EVERY 4-6 HRS  AS NEEDED FOR ABDOMINAL PAIN/SPASM TABLET, ORALLY DISINTEGRATING ORAL THREE TIMES A DAY
Qty: 90 | Refills: 3 | Status: ACTIVE | COMMUNITY
Start: 2020-07-22 | End: 2023-07-08

## 2023-06-20 RX ORDER — PANTOPRAZOLE SODIUM 40 MG/1
1 TABLET TABLET, DELAYED RELEASE ORAL TWICE A DAY
Qty: 180 TABLET | Refills: 3 | Status: ACTIVE | COMMUNITY

## 2023-06-20 RX ORDER — HYOSCYAMINE SULFATE 0.12 MG/1
1 TABLET UNDER THE TONGUE AND ALLOW TO DISSOLVE EVERY 4-6 HRS  AS NEEDED FOR ABDOMINAL PAIN/SPASM TABLET, ORALLY DISINTEGRATING ORAL THREE TIMES A DAY
Qty: 90 | Refills: 3
Start: 2020-07-22 | End: 2023-10-18

## 2023-06-20 RX ORDER — SITAGLIPTIN AND METFORMIN HYDROCHLORIDE 50; 500 MG/1; MG/1
(PRIOR AUTH#:000009075563) TABLET, FILM COATED ORAL
Qty: 90 DELAYED RELEASE TABLET | Status: ACTIVE | COMMUNITY

## 2023-06-20 RX ORDER — SIMVASTATIN 20 MG/1
(PRIOR AUTH#:000009759629) TABLET, FILM COATED ORAL
Qty: 90 DELAYED RELEASE TABLET | Status: ACTIVE | COMMUNITY

## 2023-06-20 RX ORDER — PIOGLITAZONE 15 MG/1
(PRIOR AUTH#:000001068827) TABLET ORAL
Qty: 90 DELAYED RELEASE TABLET | Status: ACTIVE | COMMUNITY

## 2023-06-20 RX ORDER — PANTOPRAZOLE SODIUM 40 MG/1
1 TABLET TABLET, DELAYED RELEASE ORAL TWICE A DAY
Qty: 180 TABLET | Refills: 3

## 2023-06-20 RX ORDER — FINASTERIDE 5 MG/1
(PRIOR AUTH#:000009075560) TABLET, FILM COATED ORAL
Qty: 90 DELAYED RELEASE TABLET | Status: ACTIVE | COMMUNITY

## 2023-06-20 RX ORDER — TAMSULOSIN HYDROCHLORIDE 0.4 MG/1
(PRIOR AUTH#:000009075562) CAPSULE ORAL
Qty: 90 CAP | Status: ACTIVE | COMMUNITY

## 2023-06-20 RX ORDER — ETANERCEPT 50 MG/ML
(PRIOR AUTH#:000006947674) SOLUTION SUBCUTANEOUS
Qty: 12 UNSPECIFIED | Status: ACTIVE | COMMUNITY

## 2023-06-20 RX ORDER — METFORMIN HYDROCHLORIDE 500 MG/1
(PRIOR AUTH#:000009075561) TABLET, FILM COATED ORAL
Qty: 90 DELAYED RELEASE TABLET | Status: ACTIVE | COMMUNITY

## 2023-06-20 RX ORDER — FAMOTIDINE 40 MG/1
(PRIOR AUTH#:000000573666) TABLET, FILM COATED ORAL
Qty: 90 DELAYED RELEASE TABLET | Status: ACTIVE | COMMUNITY

## 2023-06-20 RX ORDER — METHYLPREDNISOLONE 16 MG/1
(PRIOR AUTH#:000000547303) TABLET ORAL
Qty: 30 DELAYED RELEASE TABLET | Status: ACTIVE | COMMUNITY

## 2023-06-20 RX ORDER — SUCRALFATE 1 G/1
1 TABLET ON AN EMPTY STOMACH TABLET ORAL TWICE A DAY
Qty: 60 TABLET | Refills: 3 | Status: ACTIVE | COMMUNITY

## 2023-06-20 NOTE — HPI-TODAY'S VISIT:
6/20/2023 Mr. Cali returns for dysphagia.  He has a history of esophageal stenosis requiring dilation.  The last time he was dilated it was 9/2022.  He was dilated with a 51 Yakut savory dilator.  He has done very well. He has started having pork get stuck. He uses levsin when he eats out and it helps. HIs reflux is well controlled. he is ready to schedule another EGD.   He is planning on going on a cruise to [facility] in May 2024. RACHEL

## 2023-06-20 NOTE — HPI-OTHER HISTORIES
5/11/2022 Mr. Cali returns for follow-up.  He has a history of esophageal stenosis requiring dilation.  The last time he was dilated it was in 2020.  He was dilated with a 48 Cameroonian savory dilator.  He has done well for the last 2 years but has noticed some increasing solid food dysphagia.  This happens 7-8 times per month.  He has not had any weight loss.  5/31/2022 EGD: Moderate stenosis mesauring 8mm, dilated with Savory 42F  9/1/2022  Mr. Cali returns for dysphagia.  He has a history of esophageal stenosis requiring dilation.  The last time he was dilated it was 5/31.  He was dilated with a 42 Cameroonian savory dilator.  This helped until last week. He ate a salad and it got stuck. He was unable to handle his secretions for about 6 hrs.  He is ready to schedule another dilatation. CS  9/2/2022 EGD: Moderate schatzki's ring, dilated to 51F  12/20/2022  Mr. Cali returns for dysphagia.  He has a history of esophageal stenosis requiring dilation.  The last time he was dilated it was 9/2022.  He was dilated with a 51 Cameroonian savory dilator.  He has done great. He has been able to swallow with no issues. He will have some minor occurances but these are not too bothersome. Levsin has helped too. Overall, he is feeling really well and has no GI complaints. CS

## 2023-06-21 PROBLEM — 40890009: Status: ACTIVE | Noted: 2023-06-21

## 2023-07-24 NOTE — PHYSICAL EXAM GASTROINTESTINAL
Kicking the Smoking Habit  If you smoke, quitting is one of the best changes you can make for your heart and your overall health. Your risk of heart attack goes down within one day of putting out that last cigarette. As you go longer without smoking, your risk goes down even more. Quitting isn’t easy, but millions of people have done it. You can, too. It’s never too late to quit.  Getting started  Boost your chances of success by deciding on your “quit plan.” Your health care provider and cardiac rehab team can help you develop this plan. Even if you’ve already quit, it’s easy to slip back into smoking.  Your plan can help you avoid and recover from relapse.  In any case, start by setting a date to quit within a month, and do it.    Keys to your quit plan  Talk to your healthcare provider about prescription medicines and nicotine replacement products that help stop the urge to smoke.   Join a support group or quit smoking program. Talking with others about the challenges of quitting can help you get through them.  Ask other smokers in your household to quit with you.  Look for the cues in your life that you associate with smoking and avoid them.  Track your triggers  What gives you that “S-yrku-n-cigarette” feeling? List all the situations that make you want a cigarette. Then think of other ways to deal with these situations. Here are some examples:  Situation How I'll handle it   Finishing a meal Get up from the table and take a walk   Having an argument Find a quiet place and breathe deeply   Feeling lonely or bored Call a friend to talk         Tips for quitting successfully  List the benefits of quitting such as reducing heart risks and saving money. Keep this list and review it whenever you feel like smoking.  Get support. Let your friends know you may call them to chat when you have an urge to smoke.  If you’ve tried to quit before without success, this time avoid the triggers that may cause the  Abdomen , soft, nontender, nondistended , no guarding or rigidity , no masses palpable , normal bowel sounds , Liver and Spleen , no hepatomegaly present  , liver nontender ,  relapse.  Make the most of slip-ups. Try to learn from them, and then get back on track.  Be accountable to your friends and your calendar so that you stay on track.  For family and friends  Be supportive and patient. Quitting smoking can be difficult and stressful.  If you smoke, now’s a great time to quit. Even if you don’t quit, never smoke around your loved one. Secondhand smoke is dangerous to his or her heart.  The best goals are accomplished in teams. Remember that when your loved one states he or she wants to stop smoking.  Date Last Reviewed: 7/1/2016  © 2848-2409 [employer]. [address] [place] [address], [place] [address]. All rights reserved. This information is not intended as a substitute for professional medical care. Always follow your healthcare professional's instructions.

## 2023-08-24 ENCOUNTER — TELEPHONE ENCOUNTER (OUTPATIENT)
Dept: URBAN - NONMETROPOLITAN AREA CLINIC 2 | Facility: CLINIC | Age: 78
End: 2023-08-24

## 2023-08-28 ENCOUNTER — OFFICE VISIT (OUTPATIENT)
Dept: URBAN - NONMETROPOLITAN AREA SURGERY CENTER 1 | Facility: SURGERY CENTER | Age: 78
End: 2023-08-28
Payer: MEDICARE

## 2023-08-28 ENCOUNTER — OFFICE VISIT (OUTPATIENT)
Dept: URBAN - NONMETROPOLITAN AREA SURGERY CENTER 1 | Facility: SURGERY CENTER | Age: 78
End: 2023-08-28

## 2023-08-28 DIAGNOSIS — K22.2 ACQUIRED ESOPHAGEAL RING: ICD-10-CM

## 2023-08-28 PROCEDURE — 43248 EGD GUIDE WIRE INSERTION: CPT | Performed by: INTERNAL MEDICINE

## 2023-08-28 PROCEDURE — G8907 PT DOC NO EVENTS ON DISCHARG: HCPCS | Performed by: INTERNAL MEDICINE

## 2023-12-13 ENCOUNTER — P2P PATIENT RECORD (OUTPATIENT)
Age: 78
End: 2023-12-13

## 2024-03-12 ENCOUNTER — OV EP (OUTPATIENT)
Dept: URBAN - NONMETROPOLITAN AREA CLINIC 13 | Facility: CLINIC | Age: 79
End: 2024-03-12
Payer: MEDICARE

## 2024-03-12 VITALS
WEIGHT: 172 LBS | SYSTOLIC BLOOD PRESSURE: 131 MMHG | BODY MASS INDEX: 27 KG/M2 | HEART RATE: 97 BPM | HEIGHT: 67 IN | DIASTOLIC BLOOD PRESSURE: 75 MMHG

## 2024-03-12 DIAGNOSIS — R13.19 ESOPHAGEAL DYSPHAGIA: ICD-10-CM

## 2024-03-12 PROCEDURE — 99213 OFFICE O/P EST LOW 20 MIN: CPT | Performed by: NURSE PRACTITIONER

## 2024-03-12 RX ORDER — SITAGLIPTIN AND METFORMIN HYDROCHLORIDE 50; 500 MG/1; MG/1
(PRIOR AUTH#:000009075563) TABLET, FILM COATED ORAL
Qty: 90 DELAYED RELEASE TABLET | Status: ACTIVE | COMMUNITY

## 2024-03-12 RX ORDER — TAMSULOSIN HYDROCHLORIDE 0.4 MG/1
(PRIOR AUTH#:000009075562) CAPSULE ORAL
Qty: 90 CAP | Status: ACTIVE | COMMUNITY

## 2024-03-12 RX ORDER — FAMOTIDINE 40 MG/1
(PRIOR AUTH#:000000573666) TABLET, FILM COATED ORAL
Qty: 90 DELAYED RELEASE TABLET | Status: ACTIVE | COMMUNITY

## 2024-03-12 RX ORDER — METFORMIN HYDROCHLORIDE 500 MG/1
(PRIOR AUTH#:000009075561) TABLET, FILM COATED ORAL
Qty: 90 DELAYED RELEASE TABLET | Status: ACTIVE | COMMUNITY

## 2024-03-12 RX ORDER — METHYLPREDNISOLONE 16 MG/1
(PRIOR AUTH#:000000547303) TABLET ORAL
Qty: 30 DELAYED RELEASE TABLET | Status: ACTIVE | COMMUNITY

## 2024-03-12 RX ORDER — SUCRALFATE 1 G/1
1 TABLET ON AN EMPTY STOMACH TABLET ORAL TWICE A DAY
Qty: 60 TABLET | Refills: 3 | Status: ACTIVE | COMMUNITY

## 2024-03-12 RX ORDER — LEVOTHYROXINE SODIUM 100 UG/1
(PRIOR AUTH#:000009759633) TABLET ORAL
Qty: 90 DELAYED RELEASE TABLET | Status: ACTIVE | COMMUNITY

## 2024-03-12 RX ORDER — PIOGLITAZONE 15 MG/1
(PRIOR AUTH#:000001068827) TABLET ORAL
Qty: 90 DELAYED RELEASE TABLET | Status: ACTIVE | COMMUNITY

## 2024-03-12 RX ORDER — ETANERCEPT 50 MG/ML
(PRIOR AUTH#:000006947674) SOLUTION SUBCUTANEOUS
Qty: 12 UNSPECIFIED | Status: ACTIVE | COMMUNITY

## 2024-03-12 RX ORDER — FINASTERIDE 5 MG/1
(PRIOR AUTH#:000009075560) TABLET, FILM COATED ORAL
Qty: 90 DELAYED RELEASE TABLET | Status: ACTIVE | COMMUNITY

## 2024-03-12 RX ORDER — SIMVASTATIN 20 MG/1
(PRIOR AUTH#:000009759629) TABLET, FILM COATED ORAL
Qty: 90 DELAYED RELEASE TABLET | Status: ACTIVE | COMMUNITY

## 2024-03-12 RX ORDER — PANTOPRAZOLE SODIUM 40 MG/1
1 TABLET TABLET, DELAYED RELEASE ORAL TWICE A DAY
Qty: 180 TABLET | Refills: 3

## 2024-03-12 RX ORDER — HYOSCYAMINE SULFATE 0.12 MG/1
1 TABLET UNDER THE TONGUE AND ALLOW TO DISSOLVE EVERY 4-6 HRS AS NEEDED FOR ABDOMINAL PAIN/SPASM TABLET, ORALLY DISINTEGRATING ORAL THREE TIMES A DAY
Qty: 90 | Refills: 11
Start: 2020-07-22 | End: 2025-03-06

## 2024-03-12 RX ORDER — PANTOPRAZOLE SODIUM 40 MG/1
1 TABLET TABLET, DELAYED RELEASE ORAL TWICE A DAY
Qty: 180 TABLET | Refills: 3 | Status: ACTIVE | COMMUNITY

## 2024-03-12 NOTE — HPI-OTHER HISTORIES
5/11/2022 Mr. Cali returns for follow-up.  He has a history of esophageal stenosis requiring dilation.  The last time he was dilated it was in 2020.  He was dilated with a 48 [place] savory dilator.  He has done well for the last 2 years but has noticed some increasing solid food dysphagia.  This happens 7-8 times per month.  He has not had any weight loss.  5/31/2022 EGD: Moderate stenosis mesauring 8mm, dilated with Savory 42F  9/1/2022  Mr. Cali returns for dysphagia.  He has a history of esophageal stenosis requiring dilation.  The last time he was dilated it was 5/31.  He was dilated with a 42 [place] savory dilator.  This helped until last week. He ate a salad and it got stuck. He was unable to handle his secretions for about 6 hrs.  He is ready to schedule another dilatation. CS  9/2/2022 EGD: Moderate schatzki's ring, dilated to 51F  12/20/2022  Mr. Cali returns for dysphagia.  He has a history of esophageal stenosis requiring dilation.  The last time he was dilated it was 9/2022.  He was dilated with a 51 [place] savory dilator.  He has done great. He has been able to swallow with no issues. He will have some minor occurances but these are not too bothersome. Levsin has helped too. Overall, he is feeling really well and has no GI complaints. CS  6/20/2023 Mr. Cali returns for dysphagia. He has a history of esophageal stenosis requiring dilation. The last time he was dilated it was 9/2022. He was dilated with a 51 [place] savory dilator. He has done very well. He has started having pork get stuck. He uses levsin when he eats out and it helps. HIs reflux is well controlled. he is ready to schedule another EGD. He is planning on going on a cruise to [facility] in May 2024. CS  8/28/2023 EGD: small hiatal hernia, schatzki ring dilatd to 51F

## 2024-03-12 NOTE — PHYSICAL EXAM HENT:
Head,  normocephalic,  atraumatic,  Face,  Face within normal limits,  Ears,  External ears within normal limits,  Nose/Nasopharynx,  External nose  normal appearance,  Lips,  Appearance normal Partially impaired: cannot see medication labels or newsprint, but can see obstacles in path, and the surrounding layout; can count fingers at arm's length

## 2024-08-13 ENCOUNTER — OFFICE VISIT (OUTPATIENT)
Dept: URBAN - NONMETROPOLITAN AREA CLINIC 13 | Facility: CLINIC | Age: 79
End: 2024-08-13
Payer: MEDICARE

## 2024-08-13 ENCOUNTER — DASHBOARD ENCOUNTERS (OUTPATIENT)
Age: 79
End: 2024-08-13

## 2024-08-13 VITALS
HEIGHT: 67 IN | WEIGHT: 175 LBS | SYSTOLIC BLOOD PRESSURE: 146 MMHG | HEART RATE: 66 BPM | BODY MASS INDEX: 27.47 KG/M2 | DIASTOLIC BLOOD PRESSURE: 71 MMHG

## 2024-08-13 DIAGNOSIS — R13.19 ESOPHAGEAL DYSPHAGIA: ICD-10-CM

## 2024-08-13 PROCEDURE — 99213 OFFICE O/P EST LOW 20 MIN: CPT | Performed by: NURSE PRACTITIONER

## 2024-08-13 RX ORDER — SUCRALFATE 1 G/1
1 TABLET ON AN EMPTY STOMACH TABLET ORAL TWICE A DAY
Qty: 60 TABLET | Refills: 3 | Status: ACTIVE | COMMUNITY

## 2024-08-13 RX ORDER — HYOSCYAMINE SULFATE 0.12 MG/1
1 TABLET UNDER THE TONGUE AND ALLOW TO DISSOLVE EVERY 4-6 HRS AS NEEDED FOR ABDOMINAL PAIN/SPASM TABLET, ORALLY DISINTEGRATING ORAL THREE TIMES A DAY
Qty: 90 | Refills: 11 | Status: ACTIVE | COMMUNITY
Start: 2020-07-22 | End: 2025-03-06

## 2024-08-13 RX ORDER — PANTOPRAZOLE SODIUM 40 MG/1
1 TABLET TABLET, DELAYED RELEASE ORAL TWICE A DAY
Qty: 180 TABLET | Refills: 3

## 2024-08-13 RX ORDER — PIOGLITAZONE 15 MG/1
(PRIOR AUTH#:000001068827) TABLET ORAL
Qty: 90 DELAYED RELEASE TABLET | Status: ACTIVE | COMMUNITY

## 2024-08-13 RX ORDER — METFORMIN HYDROCHLORIDE 500 MG/1
(PRIOR AUTH#:000009075561) TABLET, FILM COATED ORAL
Qty: 90 DELAYED RELEASE TABLET | Status: ACTIVE | COMMUNITY

## 2024-08-13 RX ORDER — HYOSCYAMINE SULFATE 0.12 MG/1
1 TABLET UNDER THE TONGUE AND ALLOW TO DISSOLVE EVERY 4-6 HRS AS NEEDED FOR ABDOMINAL PAIN/SPASM TABLET, ORALLY DISINTEGRATING ORAL THREE TIMES A DAY
Qty: 90 | Refills: 11

## 2024-08-13 RX ORDER — ETANERCEPT 50 MG/ML
(PRIOR AUTH#:000006947674) SOLUTION SUBCUTANEOUS
Qty: 12 UNSPECIFIED | Status: ACTIVE | COMMUNITY

## 2024-08-13 RX ORDER — METHYLPREDNISOLONE 16 MG/1
(PRIOR AUTH#:000000547303) TABLET ORAL
Qty: 30 DELAYED RELEASE TABLET | Status: ACTIVE | COMMUNITY

## 2024-08-13 RX ORDER — FAMOTIDINE 40 MG/1
(PRIOR AUTH#:000000573666) TABLET, FILM COATED ORAL
Qty: 90 DELAYED RELEASE TABLET | Status: ACTIVE | COMMUNITY

## 2024-08-13 RX ORDER — SIMVASTATIN 20 MG/1
(PRIOR AUTH#:000009759629) TABLET, FILM COATED ORAL
Qty: 90 DELAYED RELEASE TABLET | Status: ACTIVE | COMMUNITY

## 2024-08-13 RX ORDER — TAMSULOSIN HYDROCHLORIDE 0.4 MG/1
(PRIOR AUTH#:000009075562) CAPSULE ORAL
Qty: 90 CAP | Status: ACTIVE | COMMUNITY

## 2024-08-13 RX ORDER — PANTOPRAZOLE SODIUM 40 MG/1
1 TABLET TABLET, DELAYED RELEASE ORAL TWICE A DAY
Qty: 180 TABLET | Refills: 3 | Status: ACTIVE | COMMUNITY

## 2024-08-13 RX ORDER — SITAGLIPTIN AND METFORMIN HYDROCHLORIDE 50; 500 MG/1; MG/1
(PRIOR AUTH#:000009075563) TABLET, FILM COATED ORAL
Qty: 90 DELAYED RELEASE TABLET | Status: ACTIVE | COMMUNITY

## 2024-08-13 RX ORDER — FINASTERIDE 5 MG/1
(PRIOR AUTH#:000009075560) TABLET, FILM COATED ORAL
Qty: 90 DELAYED RELEASE TABLET | Status: ACTIVE | COMMUNITY

## 2024-08-13 RX ORDER — LEVOTHYROXINE SODIUM 100 UG/1
(PRIOR AUTH#:000009759633) TABLET ORAL
Qty: 90 DELAYED RELEASE TABLET | Status: ACTIVE | COMMUNITY

## 2024-08-13 NOTE — HPI-OTHER HISTORIES
5/11/2022 Mr. Cali returns for follow-up.  He has a history of esophageal stenosis requiring dilation.  The last time he was dilated it was in 2020.  He was dilated with a 48 French savory dilator.  He has done well for the last 2 years but has noticed some increasing solid food dysphagia.  This happens 7-8 times per month.  He has not had any weight loss.  5/31/2022 EGD: Moderate stenosis mesauring 8mm, dilated with Savory 42F  9/1/2022  Mr. Cali returns for dysphagia.  He has a history of esophageal stenosis requiring dilation.  The last time he was dilated it was 5/31.  He was dilated with a 42 French savory dilator.  This helped until last week. He ate a salad and it got stuck. He was unable to handle his secretions for about 6 hrs.  He is ready to schedule another dilatation. CS  9/2/2022 EGD: Moderate schatzki's ring, dilated to 51F  12/20/2022  Mr. Cali returns for dysphagia.  He has a history of esophageal stenosis requiring dilation.  The last time he was dilated it was 9/2022.  He was dilated with a 51 French savory dilator.  He has done great. He has been able to swallow with no issues. He will have some minor occurances but these are not too bothersome. Levsin has helped too. Overall, he is feeling really well and has no GI complaints. CS  6/20/2023 Mr. Cali returns for dysphagia. He has a history of esophageal stenosis requiring dilation. The last time he was dilated it was 9/2022. He was dilated with a 51 French savory dilator. He has done very well. He has started having pork get stuck. He uses levsin when he eats out and it helps. HIs reflux is well controlled. he is ready to schedule another EGD. He is planning on going on a cruise to [facility] in May 2024. CS  8/28/2023 EGD: small hiatal hernia, schatzki ring dilatd to 51F  3/12/2024 Mr. Cali returns for f/u of dysphagia. He has a history of esophageal stenosis requiring dilation. The last time he was dilated it was 8/2023. He was dilated with a 51 French savory dilator. He has done very well. He used levsin as needed and protonix for reflux. He has had two episodes with food slowing since his dilatation. This resovled with additional liquids and he had not taken the levsin prior.  He is planning on going on a cruise to [facility] in May 2024. CS

## 2024-08-13 NOTE — HPI-TODAY'S VISIT:
8/13/2024 Mr. Cali returns for f/u of dysphagia.  He has a history of esophageal stenosis requiring dilation.  The last time he was dilated it was 8/2023.  He was dilated with a 51 French savory dilator.  He has done very well. He used levsin as needed and protonix for reflux. He has had a few episodes of dypshagia but able to get it to move with time and liquids. He fell and broke his femur. This did not require surgery. He is finishing therapy. CS

## 2024-10-31 NOTE — PHYSICAL EXAM HENT:
Head,  normocephalic,  atraumatic,  Face,  Face within normal limits,  Ears,  External ears within normal limits,  Nose/Nasopharynx,  External nose  normal appearance,  Lips,  Appearance normal Preet Carreno  Obstetrics and Gynecology  [address] [place] [id number]  Phone: [phone]  Fax: [phone]  Established Patient  Follow Up Time: 1 month

## 2025-02-13 ENCOUNTER — OFFICE VISIT (OUTPATIENT)
Dept: URBAN - NONMETROPOLITAN AREA CLINIC 13 | Facility: CLINIC | Age: 80
End: 2025-02-13
Payer: MEDICARE

## 2025-02-13 VITALS
BODY MASS INDEX: 27.78 KG/M2 | HEIGHT: 67 IN | SYSTOLIC BLOOD PRESSURE: 126 MMHG | WEIGHT: 177 LBS | DIASTOLIC BLOOD PRESSURE: 70 MMHG | HEART RATE: 70 BPM

## 2025-02-13 DIAGNOSIS — R13.19 ESOPHAGEAL DYSPHAGIA: ICD-10-CM

## 2025-02-13 DIAGNOSIS — Z12.11 COLON CANCER SCREENING: ICD-10-CM

## 2025-02-13 PROCEDURE — 99214 OFFICE O/P EST MOD 30 MIN: CPT | Performed by: NURSE PRACTITIONER

## 2025-02-13 RX ORDER — ETANERCEPT 50 MG/ML
(PRIOR AUTH#:000006947674) SOLUTION SUBCUTANEOUS
Qty: 12 UNSPECIFIED | Status: ACTIVE | COMMUNITY

## 2025-02-13 RX ORDER — HYOSCYAMINE SULFATE 0.12 MG/1
1 TABLET UNDER THE TONGUE AND ALLOW TO DISSOLVE EVERY 4-6 HRS AS NEEDED FOR ABDOMINAL PAIN/SPASM TABLET, ORALLY DISINTEGRATING ORAL THREE TIMES A DAY
Qty: 90 | Refills: 11

## 2025-02-13 RX ORDER — SITAGLIPTIN AND METFORMIN HYDROCHLORIDE 50; 500 MG/1; MG/1
(PRIOR AUTH#:000009075563) TABLET, FILM COATED ORAL
Qty: 90 DELAYED RELEASE TABLET | Status: ACTIVE | COMMUNITY

## 2025-02-13 RX ORDER — FINASTERIDE 5 MG/1
(PRIOR AUTH#:000009075560) TABLET, FILM COATED ORAL
Qty: 90 DELAYED RELEASE TABLET | Status: ACTIVE | COMMUNITY

## 2025-02-13 RX ORDER — PANTOPRAZOLE SODIUM 40 MG/1
1 TABLET TABLET, DELAYED RELEASE ORAL TWICE A DAY
Qty: 180 TABLET | Refills: 3 | Status: ACTIVE | COMMUNITY

## 2025-02-13 RX ORDER — TAMSULOSIN HYDROCHLORIDE 0.4 MG/1
(PRIOR AUTH#:000009075562) CAPSULE ORAL
Qty: 90 CAP | Status: ACTIVE | COMMUNITY

## 2025-02-13 RX ORDER — SIMVASTATIN 20 MG/1
(PRIOR AUTH#:000009759629) TABLET, FILM COATED ORAL
Qty: 90 DELAYED RELEASE TABLET | Status: ACTIVE | COMMUNITY

## 2025-02-13 RX ORDER — PANTOPRAZOLE SODIUM 40 MG/1
1 TABLET TABLET, DELAYED RELEASE ORAL TWICE A DAY
Qty: 180 TABLET | Refills: 3

## 2025-02-13 RX ORDER — PIOGLITAZONE 15 MG/1
(PRIOR AUTH#:000001068827) TABLET ORAL
Qty: 90 DELAYED RELEASE TABLET | Status: ACTIVE | COMMUNITY

## 2025-02-13 RX ORDER — METHYLPREDNISOLONE 16 MG/1
(PRIOR AUTH#:000000547303) TABLET ORAL
Qty: 30 DELAYED RELEASE TABLET | Status: ACTIVE | COMMUNITY

## 2025-02-13 RX ORDER — SUCRALFATE 1 G/1
1 TABLET ON AN EMPTY STOMACH TABLET ORAL TWICE A DAY
Qty: 60 TABLET | Refills: 3 | Status: ACTIVE | COMMUNITY

## 2025-02-13 RX ORDER — FAMOTIDINE 40 MG/1
(PRIOR AUTH#:000000573666) TABLET, FILM COATED ORAL
Qty: 90 DELAYED RELEASE TABLET | Status: ACTIVE | COMMUNITY

## 2025-02-13 RX ORDER — METFORMIN HYDROCHLORIDE 500 MG/1
(PRIOR AUTH#:000009075561) TABLET, FILM COATED ORAL
Qty: 90 DELAYED RELEASE TABLET | Status: ACTIVE | COMMUNITY

## 2025-02-13 RX ORDER — HYOSCYAMINE SULFATE 0.12 MG/1
1 TABLET UNDER THE TONGUE AND ALLOW TO DISSOLVE EVERY 4-6 HRS AS NEEDED FOR ABDOMINAL PAIN/SPASM TABLET, ORALLY DISINTEGRATING ORAL THREE TIMES A DAY
Qty: 90 | Refills: 11 | Status: ACTIVE | COMMUNITY

## 2025-02-13 RX ORDER — LEVOTHYROXINE SODIUM 100 UG/1
(PRIOR AUTH#:000009759633) TABLET ORAL
Qty: 90 DELAYED RELEASE TABLET | Status: ACTIVE | COMMUNITY

## 2025-02-13 NOTE — HPI-TODAY'S VISIT:
2/13/2025 Mr. Cali returns for f/u of dysphagia.  He has a history of esophageal stenosis requiring dilation.  The last time he was dilated it was 8/2023. He denies any return of persistant dypshagia. He will have symptoms on and off but feels this is more a spasm and the levsin helps. His reflux is well controlled. He is due for colon cancer screening this September.  His wife had a ruptured appendix and will likely need surgery.   CS

## 2025-02-13 NOTE — HPI-OTHER HISTORIES
5/11/2022 Mr. Cali returns for follow-up.  He has a history of esophageal stenosis requiring dilation.  The last time he was dilated it was in 2020.  He was dilated with a 48 French savory dilator.  He has done well for the last 2 years but has noticed some increasing solid food dysphagia.  This happens 7-8 times per month.  He has not had any weight loss.  5/31/2022 EGD: Moderate stenosis mesauring 8mm, dilated with Savory 42F  9/1/2022  Mr. Cali returns for dysphagia.  He has a history of esophageal stenosis requiring dilation.  The last time he was dilated it was 5/31.  He was dilated with a 42 French savory dilator.  This helped until last week. He ate a salad and it got stuck. He was unable to handle his secretions for about 6 hrs.  He is ready to schedule another dilatation. CS  9/2/2022 EGD: Moderate schatzki's ring, dilated to 51F  12/20/2022  Mr. Cali returns for dysphagia.  He has a history of esophageal stenosis requiring dilation.  The last time he was dilated it was 9/2022.  He was dilated with a 51 French savory dilator.  He has done great. He has been able to swallow with no issues. He will have some minor occurances but these are not too bothersome. Levsin has helped too. Overall, he is feeling really well and has no GI complaints. CS  6/20/2023 Mr. Cali returns for dysphagia. He has a history of esophageal stenosis requiring dilation. The last time he was dilated it was 9/2022. He was dilated with a 51 French savory dilator. He has done very well. He has started having pork get stuck. He uses levsin when he eats out and it helps. HIs reflux is well controlled. he is ready to schedule another EGD. He is planning on going on a cruise to [facility] in May 2024. CS  8/28/2023 EGD: small hiatal hernia, schatzki ring dilatd to 51F  3/12/2024 Mr. Cali returns for f/u of dysphagia. He has a history of esophageal stenosis requiring dilation. The last time he was dilated it was 8/2023. He was dilated with a 51 French savory dilator. He has done very well. He used levsin as needed and protonix for reflux. He has had two episodes with food slowing since his dilatation. This resovled with additional liquids and he had not taken the levsin prior.  He is planning on going on a cruise to [facility] in May 2024. CS  8/13/2024 Mr. Cali returns for f/u of dysphagia. He has a history of esophageal stenosis requiring dilation. The last time he was dilated it was 8/2023. He was dilated with a 51 French savory dilator. He has done very well. He used levsin as needed and protonix for reflux. He has had a few episodes of dypshagia but able to get it to move with time and liquids. He fell and broke his femur. This did not require surgery. He is finishing therapy. CS

## 2025-08-14 ENCOUNTER — OFFICE VISIT (OUTPATIENT)
Dept: URBAN - NONMETROPOLITAN AREA CLINIC 13 | Facility: CLINIC | Age: 80
End: 2025-08-14
Payer: MEDICARE

## 2025-08-14 ENCOUNTER — LAB OUTSIDE AN ENCOUNTER (OUTPATIENT)
Dept: URBAN - NONMETROPOLITAN AREA CLINIC 13 | Facility: CLINIC | Age: 80
End: 2025-08-14

## 2025-08-14 DIAGNOSIS — K21.9 GERD WITHOUT ESOPHAGITIS: ICD-10-CM

## 2025-08-14 DIAGNOSIS — Z12.11 COLON CANCER SCREENING: ICD-10-CM

## 2025-08-14 DIAGNOSIS — R13.19 ESOPHAGEAL DYSPHAGIA: ICD-10-CM

## 2025-08-14 PROCEDURE — 99214 OFFICE O/P EST MOD 30 MIN: CPT | Performed by: NURSE PRACTITIONER

## 2025-08-14 RX ORDER — ETANERCEPT 50 MG/ML
(PRIOR AUTH#:000006947674) SOLUTION SUBCUTANEOUS
Qty: 12 UNSPECIFIED | Status: ACTIVE | COMMUNITY

## 2025-08-14 RX ORDER — PANTOPRAZOLE SODIUM 40 MG/1
1 TABLET TABLET, DELAYED RELEASE ORAL TWICE A DAY
Qty: 180 TABLET | Refills: 3
Start: 2025-08-14

## 2025-08-14 RX ORDER — HYOSCYAMINE SULFATE 0.12 MG/1
1 TABLET UNDER THE TONGUE AND ALLOW TO DISSOLVE EVERY 4-6 HRS AS NEEDED FOR ABDOMINAL PAIN/SPASM TABLET, ORALLY DISINTEGRATING ORAL THREE TIMES A DAY
Qty: 90 | Refills: 11
Start: 2025-08-14

## 2025-08-14 RX ORDER — SITAGLIPTIN AND METFORMIN HYDROCHLORIDE 50; 500 MG/1; MG/1
(PRIOR AUTH#:000009075563) TABLET, FILM COATED ORAL
Qty: 90 DELAYED RELEASE TABLET | Status: ACTIVE | COMMUNITY

## 2025-08-14 RX ORDER — SUCRALFATE 1 G/1
1 TABLET ON AN EMPTY STOMACH TABLET ORAL TWICE A DAY
Qty: 60 TABLET | Refills: 3 | Status: ACTIVE | COMMUNITY

## 2025-08-14 RX ORDER — TAMSULOSIN HYDROCHLORIDE 0.4 MG/1
(PRIOR AUTH#:000009075562) CAPSULE ORAL
Qty: 90 CAP | Status: ACTIVE | COMMUNITY

## 2025-08-14 RX ORDER — HYOSCYAMINE SULFATE 0.12 MG/1
1 TABLET UNDER THE TONGUE AND ALLOW TO DISSOLVE EVERY 4-6 HRS AS NEEDED FOR ABDOMINAL PAIN/SPASM TABLET, ORALLY DISINTEGRATING ORAL THREE TIMES A DAY
Qty: 90 | Refills: 11 | Status: ACTIVE | COMMUNITY

## 2025-08-14 RX ORDER — PANTOPRAZOLE SODIUM 40 MG/1
1 TABLET TABLET, DELAYED RELEASE ORAL TWICE A DAY
Qty: 180 TABLET | Refills: 3 | Status: ACTIVE | COMMUNITY

## 2025-08-14 RX ORDER — FINASTERIDE 5 MG/1
(PRIOR AUTH#:000009075560) TABLET, FILM COATED ORAL
Qty: 90 DELAYED RELEASE TABLET | Status: ACTIVE | COMMUNITY

## 2025-08-14 RX ORDER — METFORMIN HYDROCHLORIDE 500 MG/1
(PRIOR AUTH#:000009075561) TABLET, FILM COATED ORAL
Qty: 90 DELAYED RELEASE TABLET | Status: ACTIVE | COMMUNITY

## 2025-08-14 RX ORDER — PIOGLITAZONE 15 MG/1
(PRIOR AUTH#:000001068827) TABLET ORAL
Qty: 90 DELAYED RELEASE TABLET | Status: ACTIVE | COMMUNITY

## 2025-08-14 RX ORDER — SIMVASTATIN 20 MG/1
(PRIOR AUTH#:000009759629) TABLET, FILM COATED ORAL
Qty: 90 DELAYED RELEASE TABLET | Status: ACTIVE | COMMUNITY

## 2025-08-14 RX ORDER — METHYLPREDNISOLONE 16 MG/1
(PRIOR AUTH#:000000547303) TABLET ORAL
Qty: 30 DELAYED RELEASE TABLET | Status: ACTIVE | COMMUNITY

## 2025-08-14 RX ORDER — LEVOTHYROXINE SODIUM 100 UG/1
(PRIOR AUTH#:000009759633) TABLET ORAL
Qty: 90 DELAYED RELEASE TABLET | Status: ACTIVE | COMMUNITY

## 2025-08-14 RX ORDER — FAMOTIDINE 40 MG/1
(PRIOR AUTH#:000000573666) TABLET, FILM COATED ORAL
Qty: 90 DELAYED RELEASE TABLET | Status: ACTIVE | COMMUNITY